# Patient Record
Sex: MALE | Race: WHITE
[De-identification: names, ages, dates, MRNs, and addresses within clinical notes are randomized per-mention and may not be internally consistent; named-entity substitution may affect disease eponyms.]

---

## 2018-11-01 ENCOUNTER — HOSPITAL ENCOUNTER (EMERGENCY)
Age: 63
LOS: 1 days | Discharge: HOME | End: 2018-11-02

## 2018-11-01 ENCOUNTER — HOSPITAL ENCOUNTER (EMERGENCY)
Dept: HOSPITAL 91 - E/R | Age: 63
LOS: 1 days | Discharge: HOME | End: 2018-11-02
Payer: MEDICAID

## 2018-11-01 DIAGNOSIS — Z90.79: ICD-10-CM

## 2018-11-01 DIAGNOSIS — N30.01: Primary | ICD-10-CM

## 2018-11-01 LAB
ADD UMIC: YES
UR ASCORBIC ACID: NEGATIVE MG/DL
UR BACTERIA: (no result) /HPF
UR BILIRUBIN (DIP): NEGATIVE MG/DL
UR BLOOD (DIP): (no result) MG/DL
UR CLARITY: (no result)
UR COLOR: (no result)
UR GLUCOSE (DIP): (no result) MG/DL
UR KETONES (DIP): NEGATIVE MG/DL
UR LEUKOCYTE ESTERASE (DIP): (no result) LEU/UL
UR NITRITE (DIP): NEGATIVE MG/DL
UR PH (DIP): 6 (ref 5–9)
UR RBC: > 182 /HPF (ref 0–5)
UR SPECIFIC GRAVITY (DIP): 1 (ref 1–1.03)
UR TOTAL PROTEIN (DIP): (no result) MG/DL
UR UROBILINOGEN (DIP): NEGATIVE MG/DL
UR WBC: 26 /HPF (ref 0–5)

## 2018-11-01 PROCEDURE — 81001 URINALYSIS AUTO W/SCOPE: CPT

## 2018-11-01 PROCEDURE — 99283 EMERGENCY DEPT VISIT LOW MDM: CPT

## 2019-06-26 ENCOUNTER — HOSPITAL ENCOUNTER (EMERGENCY)
Dept: HOSPITAL 91 - E/R | Age: 64
Discharge: HOME | End: 2019-06-26
Payer: COMMERCIAL

## 2019-06-26 ENCOUNTER — HOSPITAL ENCOUNTER (EMERGENCY)
Dept: HOSPITAL 10 - E/R | Age: 64
Discharge: HOME | End: 2019-06-26
Payer: COMMERCIAL

## 2019-06-26 VITALS
HEIGHT: 67 IN | WEIGHT: 230.16 LBS | BODY MASS INDEX: 36.12 KG/M2 | WEIGHT: 230.16 LBS | HEIGHT: 67 IN | BODY MASS INDEX: 36.12 KG/M2

## 2019-06-26 VITALS — HEART RATE: 65 BPM | SYSTOLIC BLOOD PRESSURE: 133 MMHG | RESPIRATION RATE: 22 BRPM | DIASTOLIC BLOOD PRESSURE: 97 MMHG

## 2019-06-26 DIAGNOSIS — R10.10: Primary | ICD-10-CM

## 2019-06-26 DIAGNOSIS — R31.9: ICD-10-CM

## 2019-06-26 LAB
ADD MAN DIFF?: NO
ADD UMIC: YES
ALANINE AMINOTRANSFERASE: 33 IU/L (ref 13–69)
ALBUMIN/GLOBULIN RATIO: 1.27
ALBUMIN: 4.2 G/DL (ref 3.3–4.9)
ALKALINE PHOSPHATASE: 87 IU/L (ref 42–121)
ANION GAP: 10 (ref 5–13)
ASPARTATE AMINO TRANSFERASE: 55 IU/L (ref 15–46)
BASOPHIL #: 0.1 10^3/UL (ref 0–0.1)
BASOPHILS %: 0.7 % (ref 0–2)
BILIRUBIN,DIRECT: 0 MG/DL (ref 0–0.2)
BILIRUBIN,TOTAL: 0.9 MG/DL (ref 0.2–1.3)
BLOOD UREA NITROGEN: 18 MG/DL (ref 7–20)
CALCIUM: 9.3 MG/DL (ref 8.4–10.2)
CARBON DIOXIDE: 29 MMOL/L (ref 21–31)
CHLORIDE: 103 MMOL/L (ref 97–110)
CREATININE: 0.73 MG/DL (ref 0.61–1.24)
EOSINOPHILS #: 0.1 10^3/UL (ref 0–0.5)
EOSINOPHILS %: 1.2 % (ref 0–7)
GLOBULIN: 3.3 G/DL (ref 1.3–3.2)
GLUCOSE: 99 MG/DL (ref 70–220)
HEMATOCRIT: 46.9 % (ref 42–52)
HEMOGLOBIN: 16.1 G/DL (ref 14–18)
IMMATURE GRANS #M: 0.01 10^3/UL (ref 0–0.03)
IMMATURE GRANS % (M): 0.1 % (ref 0–0.43)
LIPASE: 70 U/L (ref 23–300)
LYMPHOCYTES #: 1.9 10^3/UL (ref 0.8–2.9)
LYMPHOCYTES %: 25 % (ref 15–51)
MEAN CORPUSCULAR HEMOGLOBIN: 31.4 PG (ref 29–33)
MEAN CORPUSCULAR HGB CONC: 34.3 G/DL (ref 32–37)
MEAN CORPUSCULAR VOLUME: 91.4 FL (ref 82–101)
MEAN PLATELET VOLUME: 10.7 FL (ref 7.4–10.4)
MONOCYTE #: 0.6 10^3/UL (ref 0.3–0.9)
MONOCYTES %: 8.4 % (ref 0–11)
NEUTROPHIL #: 4.8 10^3/UL (ref 1.6–7.5)
NEUTROPHILS %: 64.6 % (ref 39–77)
NUCLEATED RED BLOOD CELLS #: 0 10^3/UL (ref 0–0)
NUCLEATED RED BLOOD CELLS%: 0 /100WBC (ref 0–0)
PLATELET COUNT: 248 10^3/UL (ref 140–415)
POTASSIUM: 4.4 MMOL/L (ref 3.5–5.1)
RED BLOOD COUNT: 5.13 10^6/UL (ref 4.7–6.1)
RED CELL DISTRIBUTION WIDTH: 12 % (ref 11.5–14.5)
SODIUM: 142 MMOL/L (ref 135–144)
TOTAL PROTEIN: 7.5 G/DL (ref 6.1–8.1)
UR ASCORBIC ACID: NEGATIVE MG/DL
UR BILIRUBIN (DIP): NEGATIVE MG/DL
UR BLOOD (DIP): (no result) MG/DL
UR CLARITY: CLEAR
UR COLOR: (no result)
UR GLUCOSE (DIP): NEGATIVE MG/DL
UR KETONES (DIP): NEGATIVE MG/DL
UR LEUKOCYTE ESTERASE (DIP): NEGATIVE LEU/UL
UR NITRITE (DIP): NEGATIVE MG/DL
UR PH (DIP): 6 (ref 5–9)
UR RBC: 1 /HPF (ref 0–5)
UR SPECIFIC GRAVITY (DIP): 1.01 (ref 1–1.03)
UR TOTAL PROTEIN (DIP): NEGATIVE MG/DL
UR UROBILINOGEN (DIP): NEGATIVE MG/DL
UR WBC: 1 /HPF (ref 0–5)
WHITE BLOOD COUNT: 7.4 10^3/UL (ref 4.8–10.8)

## 2019-06-26 PROCEDURE — 80053 COMPREHEN METABOLIC PANEL: CPT

## 2019-06-26 PROCEDURE — 83690 ASSAY OF LIPASE: CPT

## 2019-06-26 PROCEDURE — 81001 URINALYSIS AUTO W/SCOPE: CPT

## 2019-06-26 PROCEDURE — 85025 COMPLETE CBC W/AUTO DIFF WBC: CPT

## 2019-06-26 PROCEDURE — 74176 CT ABD & PELVIS W/O CONTRAST: CPT

## 2019-06-26 PROCEDURE — 99284 EMERGENCY DEPT VISIT MOD MDM: CPT

## 2019-06-26 NOTE — ERD
ER Documentation


Chief Complaint


Chief Complaint





hematuria w/upper ab pain x 3 days





HPI


64-year-old male with history of prostatic hypertrophy status post remote TURP 


presents to the ED complaining of several day history of mild, crampy, diffuse, 


nonradiating upper abdominal pain and today had an episode of hematuria which 


has since resolved.  Currently is pain-free.  Denies chest pain or palpitations.


 No shortness of breath or cough.  Denies dysuria, polyuria flank pain.  No 


fevers or chills.





ROS


All systems reviewed and are negative except as per history of present illness.





Medications


Home Meds


Discontinued Scripts


Cephalexin* (Keflex*) 500 Mg Capsule, 500 MG PO BID for 10 Days, CAP


   Prov:JEN RAMSEY MD         18





Allergies


Allergies:  


Coded Allergies:  


     No Known Allergy (Unverified , 19)





PMhx/Soc


Medical and Surgical Hx:  pt denies Surgical Hx


History of Surgery:  No


Anesthesia Reaction:  No


Hx Neurological Disorder:  No


Hx Respiratory Disorders:  No


Hx Cardiac Disorders:  No


Hx Psychiatric Problems:  No


Hx Miscellaneous Medical Probl:  Yes (PROSTATE  PROBLEM )


Hx Alcohol Use:  Yes


Hx Substance Use:  No


Hx Tobacco Use:  No


Smoking Status:  Never smoker





FmHx


No stroke or cancer





Physical Exam


Vitals





Vital Signs


  Date      Temp  Pulse  Resp  B/P (MAP)   Pulse Ox  O2          O2 Flow    FiO2


Time                                                 Delivery    Rate


   19           74    22      131/99        97  Room Air


     22:00                          (110)


   19           86    20     146/101        98  Room Air


     19:52                          (116)


   19  98.8     74    20      167/98        97


     19:48                          (121)





Physical Exam


Const:   No acute distress


Head:   Atraumatic 


Eyes:    Normal Conjunctiva


ENT:    Normal External Ears, Nose and Mouth.


Neck:               Full range of motion. No meningismus.


Resp:   Clear to auscultation bilaterally


Cardio:   Regular rate and rhythm, no murmurs


Abd:    Soft, non tender, non distended. Normal bowel sounds


Skin:   No petechiae or rashes


Back:   No midline or flank tenderness


Ext:    No cyanosis, or edema


Neur:   Awake and alert


Psych:    Normal Mood and Affect


Result Diagram:  


19





Results 24 hrs





Laboratory Tests


              Test
                                  19
20:04


              White Blood Count                       7.4 10^3/ul


              Red Blood Count                        5.13 10^6/ul


              Hemoglobin                                16.1 g/dl


              Hematocrit                                   46.9 %


              Mean Corpuscular Volume                     91.4 fl


              Mean Corpuscular Hemoglobin                 31.4 pg


              Mean Corpuscular Hemoglobin
Concent      34.3 g/dl 



              Red Cell Distribution Width                  12.0 %


              Platelet Count                          248 10^3/UL


              Mean Platelet Volume                        10.7 fl


              Immature Granulocytes %                     0.100 %


              Neutrophils %                                64.6 %


              Lymphocytes %                                25.0 %


              Monocytes %                                   8.4 %


              Eosinophils %                                 1.2 %


              Basophils %                                   0.7 %


              Nucleated Red Blood Cells %             0.0 /100WBC


              Immature Granulocytes #               0.010 10^3/ul


              Neutrophils #                           4.8 10^3/ul


              Lymphocytes #                           1.9 10^3/ul


              Monocytes #                             0.6 10^3/ul


              Eosinophils #                           0.1 10^3/ul


              Basophils #                             0.1 10^3/ul


              Nucleated Red Blood Cells #             0.0 10^3/ul


              Urine Color                          STRAW


              Urine Clarity                        CLEAR


              Urine pH                                        6.0


              Urine Specific Gravity                        1.010


              Urine Ketones                        NEGATIVE mg/dL


              Urine Nitrite                        NEGATIVE mg/dL


              Urine Bilirubin                      NEGATIVE mg/dL


              Urine Urobilinogen                   NEGATIVE mg/dL


              Urine Leukocyte Esterase
            NEGATIVE
[place]/ul


              Urine Microscopic RBC                        1 /HPF


              Urine Microscopic WBC                        1 /HPF


              Urine Hemoglobin                           1+ mg/dL


              Urine Glucose                        NEGATIVE mg/dL


              Urine Total Protein                  NEGATIVE mg/dl


              Sodium Level                             142 mmol/L


              Potassium Level                          4.4 mmol/L


              Chloride Level                           103 mmol/L


              Carbon Dioxide Level                      29 mmol/L


              Anion Gap                                        10


              Blood Urea Nitrogen                        18 mg/dl


              Creatinine                               0.73 mg/dl


              Est Glomerular Filtrat Rate
mL/min   > 60 mL/min 



              Glucose Level                              99 mg/dl


              Calcium Level                             9.3 mg/dl


              Total Bilirubin                           0.9 mg/dl


              Direct Bilirubin                         0.00 mg/dl


              Indirect Bilirubin                        0.9 mg/dl


              Aspartate Amino Transf
(AST/SGOT)          55 IU/L 



              Alanine Aminotransferase
(ALT/SGPT)        33 IU/L 



              Alkaline Phosphatase                        87 IU/L


              Total Protein                              7.5 g/dl


              Albumin                                    4.2 g/dl


              Globulin                                  3.30 g/dl


              Albumin/Globulin Ratio                         1.27


              Lipase                                       70 U/L








Procedures/MDM


DOCUMENTS REVIEWED:   ED nurse, prior records





IMAGING: 





 PROCEDURE:   CT abdomen and pelvis without contrast. 


 


CLINICAL INDICATION:   Abdominal pain. Hematuria.  


 


TECHNIQUE:   CT scan of the abdomen and pelvis without oral contrast was 


performed and is reconstructed at 2.5 mm contiguous axial intervals from the 


dome of the diaphragm to the inferior pubic rami..  The patient was scanned 


without intravenous contrast.  Sagittal and coronal reformatted images were 


obtained from the axial source images. 


 


The calculated radiation dose measures 1428 mGy centimeters. 


 


The CTDI measures 21 mGy.


 


Individualized dose optimization technique was used for the performance of this 


exam.


This included


1. Automated exposure control.


2. Adjustment of the mA and / or kV according to the patient's size.


3. Use of iterative reconstructed technique.


 


DICOM images are available.


 


COMPARISON:   CT abdomen pelvis 2018 


 


FINDINGS:


 


 


The lung bases are clear of any infiltrate or nodule.  No effusion is seen.


The liver is of normal size, contour and attenuation with no solid mass or 


ductal dilatation.  No gallstones are visualized.  No splenic, adrenal or 


pancreatic abnormalities present. 


 Kidneys are of normal size and contour.  No hydronephrosis, calculus or mass Is


seen.  Ureters are of normal course and caliber with no stone.  No bladder mass 


or stone is present. prostate is enlarged. There is questionable invasion of the


base of the urinary bladder. Noted are bilateral inguinal hernias containing 


fat.


  There is no aneurysm.  No adenopathy is present.


  No bowel mass or obstruction is present.  The appendix is normal.  No 


phlegmon, ascites or pneumoperitoneum is visualized.


The osseous structures are intact.


 


IMPRESSION:


No evidence of urolithiasis, obstructive uropathy, diverticulitis or 


appendicitis.


 


Enlarged prostate. Correlation with PSA is suggested. Question invasion base 


urinary bladder.


 


Tiny hepatic cyst.


_____________________________________________ 


.José Radford MD, MD           Date    Time 


Electronically viewed and signed by .José Radford MD, MD on 2019 


22:38 


 


D:  2019 22:38  T:  2019 22:38


.A/

















[employer]-year-old male with a history of prostatic 


hypertrophy status post remote TURP presents to the ED complaining of abdominal 


pain and hematuria which has since resolved.  CBC negative for anemia, 


leukocytosis or thrombocytopenia.  Chemistry reveals no renal insufficiency or 


electrolyte abnormalities.  Urinalysis significant for 1 WBC and 1 RBC per high-


power field.  CT of the abdomen pelvis without contrast performed to evaluate 


for obstructive uropathy, abdominal aortic aneurysm, bowel obstruction, appendic


itis and cholecystitis unremarkable.  Symptoms resolved and patient 


asymptomatic.  Stable for discharge with precautionary instructions and 


outpatient follow-up as counseled.








Counseled patient and family regarding diagnostic workup, diagnosis and need for


followup. Understands to return to ED if symptoms recur, worsen or any other 


concerns.





Departure


Diagnosis:  


   Primary Impression:  


   Acute bilateral upper abdominal pain


   Additional Impression:  


   Hematuria


   Hematuria type:  unspecified type  Qualified Codes:  R31.9 - Hematuria, 


   unspecified


Condition:  Stable











CAN KENDALL MD           2019 23:12